# Patient Record
Sex: MALE | Race: OTHER | Employment: FULL TIME | URBAN - METROPOLITAN AREA
[De-identification: names, ages, dates, MRNs, and addresses within clinical notes are randomized per-mention and may not be internally consistent; named-entity substitution may affect disease eponyms.]

---

## 2017-10-06 ENCOUNTER — HOSPITAL ENCOUNTER (EMERGENCY)
Facility: HOSPITAL | Age: 32
Discharge: HOME | End: 2017-10-06
Attending: EMERGENCY MEDICINE
Payer: COMMERCIAL

## 2017-10-06 VITALS
SYSTOLIC BLOOD PRESSURE: 132 MMHG | DIASTOLIC BLOOD PRESSURE: 86 MMHG | HEART RATE: 84 BPM | WEIGHT: 130.06 LBS | OXYGEN SATURATION: 100 % | RESPIRATION RATE: 20 BRPM | BODY MASS INDEX: 21.67 KG/M2 | HEIGHT: 65 IN | TEMPERATURE: 98 F

## 2017-10-06 DIAGNOSIS — E10.9 TYPE 1 DIABETES MELLITUS WITHOUT COMPLICATION (HCC): Primary | ICD-10-CM

## 2017-10-06 DIAGNOSIS — Z76.0 MEDICATION REFILL: ICD-10-CM

## 2017-10-06 PROCEDURE — 99283 EMERGENCY DEPT VISIT LOW MDM: CPT

## 2017-10-06 PROCEDURE — 82962 GLUCOSE BLOOD TEST: CPT

## 2017-10-06 NOTE — ED INITIAL ASSESSMENT (HPI)
Pt states his insulin ran out this morning and tried to reach MD all day unable to, now here for medication refill,

## 2017-10-07 NOTE — ED PROVIDER NOTES
Patient Seen in: [facility] Emergency Department    History   Patient presents with:  Medication Administration      HPI    Patient presents to the ED requesting a refill of his Humalog insulin.   States that his bowel broke and he is currently out 132/86  Pulse: 84  Resp: 20  Temp: 98.2 °F (36.8 °C)  Temp src: Temporal  SpO2: 100 %  O2 Device: None (Room air)    Physical Exam   Constitutional: He is oriented to person, place, and time. He appears well-developed and well-nourished. No distress.    HEN discharge.     Condition upon leaving the department: Stable    Disposition and Plan     Clinical Impression:  Type 1 diabetes mellitus without complication ([facility] [address]  (primary encounter diagnosis)  Medication refill    Disposition:  Discharge    Follow-up:  Perfecto Guzman

## 2018-01-06 ENCOUNTER — HOSPITAL ENCOUNTER (OUTPATIENT)
Age: 33
Discharge: HOME | End: 2018-01-06
Payer: COMMERCIAL

## 2018-01-06 VITALS
BODY MASS INDEX: 20.9 KG/M2 | OXYGEN SATURATION: 100 % | HEART RATE: 81 BPM | RESPIRATION RATE: 18 BRPM | TEMPERATURE: 98 F | WEIGHT: 130.06 LBS | HEIGHT: 66 IN | SYSTOLIC BLOOD PRESSURE: 109 MMHG | DIASTOLIC BLOOD PRESSURE: 76 MMHG

## 2018-01-06 DIAGNOSIS — K12.0 CANKER SORES ORAL: Primary | ICD-10-CM

## 2018-01-06 DIAGNOSIS — B08.4 ENTEROVIRAL VESICULAR STOMATITIS WITH EXANTHEM: ICD-10-CM

## 2018-01-06 PROCEDURE — 99213 OFFICE O/P EST LOW 20 MIN: CPT

## 2018-01-06 PROCEDURE — 99214 OFFICE O/P EST MOD 30 MIN: CPT

## 2018-01-06 RX ORDER — PENICILLIN V POTASSIUM 500 MG/1
500 TABLET ORAL 4 TIMES DAILY
Qty: 40 TABLET | Refills: 0 | Status: SHIPPED | OUTPATIENT
Start: 2018-01-06 | End: 2018-01-16

## 2018-01-06 NOTE — ED PROVIDER NOTES
Patient Seen in: [address]    History   Patient presents with:  Dental Problem (dental)    Stated Complaint: ulcer in mouth    HPI    Patient is a 46-year-old male with history of type 1 diabetes who presents for evaluat Mouth/Throat: Mucous membranes are normal. Oral lesions present. Canker sore to buccal mucosa left cheek, no active bleeding, mild surrounding erythema   Eyes: Conjunctivae are normal. Pupils are equal, round, and reactive to light.    Cardiovascular:

## 2018-01-06 NOTE — ED INITIAL ASSESSMENT (HPI)
PATIENT ARRIVED AMBULATORY TO ROOM C/O A SORE TO BILATERAL CHEEKS. PATIENT STATES \"I BIT MY CHEEK YESTERDAY\" NO DRAINAGE FROM ULCERS. NO FEVERS. EASY NON LABORED RESPIRATIONS.

## 2018-07-31 ENCOUNTER — HOSPITAL ENCOUNTER (OUTPATIENT)
Age: 33
Discharge: HOME | End: 2018-07-31
Payer: COMMERCIAL

## 2018-07-31 VITALS
HEART RATE: 92 BPM | OXYGEN SATURATION: 100 % | BODY MASS INDEX: 21.97 KG/M2 | TEMPERATURE: 98 F | HEIGHT: 66 IN | WEIGHT: 136.69 LBS | DIASTOLIC BLOOD PRESSURE: 70 MMHG | RESPIRATION RATE: 20 BRPM | SYSTOLIC BLOOD PRESSURE: 116 MMHG

## 2018-07-31 DIAGNOSIS — S05.02XA ABRASION OF LEFT CORNEA, INITIAL ENCOUNTER: Primary | ICD-10-CM

## 2018-07-31 DIAGNOSIS — S05.92XA LEFT EYE INJURY, INITIAL ENCOUNTER: ICD-10-CM

## 2018-07-31 PROCEDURE — 99213 OFFICE O/P EST LOW 20 MIN: CPT

## 2018-07-31 PROCEDURE — 99214 OFFICE O/P EST MOD 30 MIN: CPT

## 2018-07-31 RX ORDER — OFLOXACIN 3 MG/ML
2 SOLUTION/ DROPS OPHTHALMIC 4 TIMES DAILY
Qty: 1 BOTTLE | Refills: 0 | Status: SHIPPED | OUTPATIENT
Start: 2018-07-31 | End: 2018-08-07

## 2018-07-31 NOTE — ED PROVIDER NOTES
Patient presents with: Eye Visual Problem (opthalmic)      HPI:     Sudha Easley is a 29year old male who presents today with a chief complaint of a left eye injury that occurred a couple days ago.   The patient states he was playing tennis with his eyegla Exam:    /70   Pulse 92   Temp 97.5 °F (36.4 °C) (Oral)   Resp 20   Ht 167.6 cm (5' 6\")   Wt 62 kg   SpO2 100%   BMI 22.06 kg/m²   GENERAL: well developed, well nourished, well hydrated, no distress  SKIN: good skin turgor, no obvious rashes  HEENT: these tonight. He is also aware he can take Tylenol or ibuprofen for discomfort and if he develops any change in vision, eye pain, or worsening symptoms, to go to the nearest emergency department for further evaluation. Diagnosis:    ICD-10-CM    1.  Ab

## 2018-07-31 NOTE — ED INITIAL ASSESSMENT (HPI)
PATIENT REPORTS LEFT EYE PAIN X 2 DAYS. STATES HE WAS ACCIDENTALLY STRUCK BY A TENNIS RACQUET TO HIS LEFT EYE WHILE PLAYING 2 DAYS AGO. WEARING GLASSES DURING THE INJURY.

## 2021-01-11 PROBLEM — M77.8 RIGHT WRIST TENDONITIS: Status: ACTIVE | Noted: 2021-01-11

## 2022-04-08 PROBLEM — M77.8 RIGHT WRIST TENDONITIS: Status: RESOLVED | Noted: 2021-01-11 | Resolved: 2022-04-08

## 2022-07-22 ENCOUNTER — LAB SERVICES (OUTPATIENT)
Dept: LAB | Age: 37
End: 2022-07-22

## 2022-07-22 DIAGNOSIS — E10.9 TYPE 1 DIABETES MELLITUS WITHOUT COMPLICATION (CMD): Primary | ICD-10-CM

## 2022-07-22 DIAGNOSIS — N46.9 MALE INFERTILITY, UNSPECIFIED: ICD-10-CM

## 2022-07-22 LAB
ALBUMIN SERPL-MCNC: 4.5 G/DL (ref 3.6–5.1)
ALBUMIN/CREAT UR: 62.5 MG/G
ALP SERPL-CCNC: 110 U/L (ref 45–115)
ALT SERPL W/O P-5'-P-CCNC: 39 U/L (ref 5–49)
AST SERPL-CCNC: 32 U/L (ref 14–43)
BILIRUB CONJ SERPL-MCNC: 0 MG/DL (ref 0–0.3)
BILIRUB SERPL-MCNC: 1.1 MG/DL (ref 0–1.3)
CHOLEST SERPL-MCNC: 249 MG/DL (ref 140–200)
CREAT SERPL-MCNC: 0.9 MG/DL (ref 0.6–1.6)
CREAT UR-MCNC: 65 MG/DL (ref 30–125)
EST. AVERAGE GLUCOSE BLD GHB EST-MCNC: 196 MG/DL (ref 0–154)
GFR SERPL CREATININE-BSD FRML MDRD: >60 ML/MIN/{1.73M2}
GFR SERPL CREATININE-BSD FRML MDRD: >60 ML/MIN/{1.73M2}
HBA1C MFR BLD: 8.5 % (ref 4.2–6)
HDLC SERPL-MCNC: 43 MG/DL
LDLC SERPL CALC-MCNC: 159 MG/DL (ref 30–100)
MICROALBUMIN UR-MCNC: 40.6 MG/L
PROT SERPL-MCNC: 7.5 G/DL (ref 6.4–8.5)
T4 FREE SERPL-MCNC: 1.38 NG/DL (ref 0.78–2.19)
TRIGL SERPL-MCNC: 234 MG/DL (ref 0–200)
TSH SERPL DL<=0.05 MIU/L-ACNC: 1.19 M[IU]/L (ref 0.3–4.82)

## 2022-07-22 PROCEDURE — 84443 ASSAY THYROID STIM HORMONE: CPT | Performed by: INTERNAL MEDICINE

## 2022-07-22 PROCEDURE — 36415 COLL VENOUS BLD VENIPUNCTURE: CPT | Performed by: INTERNAL MEDICINE

## 2022-07-22 PROCEDURE — 82043 UR ALBUMIN QUANTITATIVE: CPT | Performed by: INTERNAL MEDICINE

## 2022-07-22 PROCEDURE — 83036 HEMOGLOBIN GLYCOSYLATED A1C: CPT | Performed by: INTERNAL MEDICINE

## 2022-07-22 PROCEDURE — 80061 LIPID PANEL: CPT | Performed by: INTERNAL MEDICINE

## 2022-07-22 PROCEDURE — 82565 ASSAY OF CREATININE: CPT | Performed by: INTERNAL MEDICINE

## 2022-07-22 PROCEDURE — 84439 ASSAY OF FREE THYROXINE: CPT | Performed by: INTERNAL MEDICINE

## 2022-07-22 PROCEDURE — 82570 ASSAY OF URINE CREATININE: CPT | Performed by: INTERNAL MEDICINE

## 2022-07-22 PROCEDURE — 80076 HEPATIC FUNCTION PANEL: CPT | Performed by: INTERNAL MEDICINE

## 2022-07-25 LAB — FAX RESULTS: NORMAL

## 2023-02-01 ENCOUNTER — APPOINTMENT (OUTPATIENT)
Dept: INTERNAL MEDICINE | Age: 38
End: 2023-02-01

## 2023-10-26 ENCOUNTER — LAB SERVICES (OUTPATIENT)
Dept: LAB | Age: 38
End: 2023-10-26

## 2023-10-26 DIAGNOSIS — E10.9 TYPE 1 DIABETES MELLITUS WITHOUT COMPLICATIONS (CMD): Primary | ICD-10-CM

## 2023-10-26 LAB
ALBUMIN SERPL-MCNC: 3.9 G/DL (ref 3.6–5.1)
ALP SERPL-CCNC: 128 UNITS/L (ref 45–117)
ALT SERPL-CCNC: 50 UNITS/L
AST SERPL-CCNC: 31 UNITS/L
BILIRUB CONJ SERPL-MCNC: 0.2 MG/DL (ref 0–0.2)
BILIRUB SERPL-MCNC: 1.2 MG/DL (ref 0.2–1)
CREAT SERPL-MCNC: 0.86 MG/DL (ref 0.67–1.17)
EGFRCR SERPLBLD CKD-EPI 2021: >90 ML/MIN/{1.73_M2}
HBA1C MFR BLD: 6.6 % (ref 4.5–5.6)
PROT SERPL-MCNC: 7 G/DL (ref 6.4–8.2)

## 2023-10-26 PROCEDURE — 36415 COLL VENOUS BLD VENIPUNCTURE: CPT | Performed by: INTERNAL MEDICINE

## 2023-10-26 PROCEDURE — 82565 ASSAY OF CREATININE: CPT | Performed by: INTERNAL MEDICINE

## 2023-10-26 PROCEDURE — 83036 HEMOGLOBIN GLYCOSYLATED A1C: CPT | Performed by: INTERNAL MEDICINE

## 2023-10-26 PROCEDURE — 80076 HEPATIC FUNCTION PANEL: CPT | Performed by: INTERNAL MEDICINE

## 2024-03-08 ENCOUNTER — APPOINTMENT (OUTPATIENT)
Dept: GENERAL RADIOLOGY | Age: 39
End: 2024-03-08
Attending: NURSE PRACTITIONER
Payer: COMMERCIAL

## 2024-03-08 ENCOUNTER — HOSPITAL ENCOUNTER (OUTPATIENT)
Age: 39
Discharge: HOME | End: 2024-03-08
Payer: COMMERCIAL

## 2024-03-08 VITALS
RESPIRATION RATE: 20 BRPM | SYSTOLIC BLOOD PRESSURE: 137 MMHG | BODY MASS INDEX: 21.34 KG/M2 | OXYGEN SATURATION: 99 % | TEMPERATURE: 98 F | WEIGHT: 125 LBS | HEIGHT: 64 IN | DIASTOLIC BLOOD PRESSURE: 94 MMHG | HEART RATE: 104 BPM

## 2024-03-08 DIAGNOSIS — H66.90 ACUTE OTITIS MEDIA, UNSPECIFIED OTITIS MEDIA TYPE: Primary | ICD-10-CM

## 2024-03-08 DIAGNOSIS — R00.0 SINUS TACHYCARDIA: ICD-10-CM

## 2024-03-08 LAB
#MXD IC: 0.4 X10ˆ3/UL (ref 0.1–1)
ATRIAL RATE: 94 BPM
BUN BLD-MCNC: 10 MG/DL (ref 7–18)
CHLORIDE BLD-SCNC: 99 MMOL/L (ref 98–112)
CO2 BLD-SCNC: 29 MMOL/L (ref 21–32)
CREAT BLD-MCNC: 0.9 MG/DL
DDIMER WHOLE BLOOD: <200 NG/ML DDU (ref ?–400)
EGFRCR SERPLBLD CKD-EPI 2021: 112 ML/MIN/1.73M2 (ref 60–?)
GLUCOSE BLD-MCNC: 180 MG/DL (ref 70–99)
HCT VFR BLD AUTO: 46.2 %
HCT VFR BLD CALC: 46 %
HGB BLD-MCNC: 15.8 G/DL
ISTAT IONIZED CALCIUM FOR CHEM 8: 1.2 MMOL/L (ref 1.12–1.32)
LYMPHOCYTES # BLD AUTO: 2.2 X10ˆ3/UL (ref 1–4)
LYMPHOCYTES NFR BLD AUTO: 23.2 %
MCH RBC QN AUTO: 28.1 PG (ref 26–34)
MCHC RBC AUTO-ENTMCNC: 34.2 G/DL (ref 31–37)
MCV RBC AUTO: 82.2 FL (ref 80–100)
MIXED CELL %: 4.4 %
NEUTROPHILS # BLD AUTO: 7 X10ˆ3/UL (ref 1.5–7.7)
NEUTROPHILS NFR BLD AUTO: 72.4 %
P AXIS: 56 DEGREES
P-R INTERVAL: 136 MS
PLATELET # BLD AUTO: 296 X10ˆ3/UL (ref 150–450)
POTASSIUM BLD-SCNC: 4.3 MMOL/L (ref 3.6–5.1)
Q-T INTERVAL: 332 MS
QRS DURATION: 76 MS
QTC CALCULATION (BEZET): 415 MS
R AXIS: 66 DEGREES
RBC # BLD AUTO: 5.62 X10ˆ6/UL
SODIUM BLD-SCNC: 139 MMOL/L (ref 136–145)
T AXIS: 63 DEGREES
TROPONIN I BLD-MCNC: <0.02 NG/ML
VENTRICULAR RATE: 94 BPM
WBC # BLD AUTO: 9.6 X10ˆ3/UL (ref 4–11)

## 2024-03-08 PROCEDURE — 93000 ELECTROCARDIOGRAM COMPLETE: CPT | Performed by: NURSE PRACTITIONER

## 2024-03-08 PROCEDURE — 99204 OFFICE O/P NEW MOD 45 MIN: CPT | Performed by: NURSE PRACTITIONER

## 2024-03-08 PROCEDURE — 85025 COMPLETE CBC W/AUTO DIFF WBC: CPT | Performed by: NURSE PRACTITIONER

## 2024-03-08 PROCEDURE — 84484 ASSAY OF TROPONIN QUANT: CPT | Performed by: NURSE PRACTITIONER

## 2024-03-08 PROCEDURE — 85378 FIBRIN DEGRADE SEMIQUANT: CPT | Performed by: NURSE PRACTITIONER

## 2024-03-08 PROCEDURE — 80047 BASIC METABLC PNL IONIZED CA: CPT | Performed by: NURSE PRACTITIONER

## 2024-03-08 PROCEDURE — 71046 X-RAY EXAM CHEST 2 VIEWS: CPT | Performed by: NURSE PRACTITIONER

## 2024-03-08 RX ORDER — SODIUM CHLORIDE 9 MG/ML
1000 INJECTION, SOLUTION INTRAVENOUS ONCE
Status: COMPLETED | OUTPATIENT
Start: 2024-03-08 | End: 2024-03-08

## 2024-03-08 RX ORDER — AMOXICILLIN AND CLAVULANATE POTASSIUM 875; 125 MG/1; MG/1
1 TABLET, FILM COATED ORAL 2 TIMES DAILY
Qty: 20 TABLET | Refills: 0 | Status: SHIPPED | OUTPATIENT
Start: 2024-03-08 | End: 2024-03-18

## 2024-03-08 RX ORDER — AMOXICILLIN AND CLAVULANATE POTASSIUM 875; 125 MG/1; MG/1
1 TABLET, FILM COATED ORAL 2 TIMES DAILY
Qty: 20 TABLET | Refills: 0 | Status: SHIPPED | OUTPATIENT
Start: 2024-03-08 | End: 2024-03-08

## 2024-03-08 RX ORDER — BENZONATATE 100 MG/1
100 CAPSULE ORAL 3 TIMES DAILY PRN
Qty: 30 CAPSULE | Refills: 0 | Status: SHIPPED | OUTPATIENT
Start: 2024-03-08 | End: 2024-04-07

## 2024-03-08 NOTE — ED PROVIDER NOTES
Patient Seen in: [facility]      History     Chief Complaint   Patient presents with    Cough    Ear Pain     Stated Complaint: heart elevated x 3 days , right ear infection x 1 week , cough    Subjective:   39-year-old male presents today with complaints of tachycardia.  States did not feel palpitations but his Apple Watch alerted him to high heart rate in 110s to 120 bpm.  Patient denies having any chest pain shortness of breath.  Recently has been sick with URI symptoms ear pain and lingering cough.  Was treated with antibiotics while in [place] however states did not complete the prescription.  Patient recently did fly from [place] and has been back for about 1 week.  No recent nausea vomiting diarrhea.  No recent issues with bleeding.  No changes in medications.  Denies any headaches dizziness no changes of vision.  No swelling to extremities.  Denies any fever or chills, is afebrile on arrival.  Patient is alert oriented x 3.  No other symptoms or concerns.  The patient's medication list, past medical history and social history elements as listed in today's nurse's notes were reviewed and agreed (except as otherwise stated in the HPI).  The patient's family history reviewed and determined to be noncontributory to the presenting problem            Objective:   No pertinent past medical history.            No pertinent past surgical history.              No pertinent social history.            Review of Systems    Positive for stated complaint: heart elevated x 3 days , right ear infection x 1 week , cough  Other systems are as noted in HPI.  Constitutional and vital signs reviewed.      All other systems reviewed and negative except as noted above.    Physical Exam     ED Triage Vitals [03/08/24 1224]   BP (!) 137/94   Pulse 104   Resp 20   Temp 97.8 °F (36.6 °C)   Temp src Temporal   SpO2 99 %   O2 Device None (Room air)       Current:BP (!) 137/94   Pulse 104   Temp 97.8 °F (36.6 °C) (Temporal)    Resp 20   Ht 162.6 cm (5' 4\")   Wt 56.7 kg   SpO2 99%   BMI 21.46 kg/m²         Physical Exam  Vitals and nursing note reviewed.   Constitutional:       General: He is not in acute distress.     Appearance: Normal appearance. He is not ill-appearing.   HENT:      Head: Normocephalic.      Right Ear: Tympanic membrane is erythematous.      Left Ear: Tympanic membrane is erythematous.      Nose: Nose normal.      Mouth/Throat:      Lips: Pink.      Mouth: Mucous membranes are moist.   Eyes:      Pupils: Pupils are equal, round, and reactive to light.   Cardiovascular:      Rate and Rhythm: Regular rhythm. Tachycardia present.   Pulmonary:      Effort: Pulmonary effort is normal.      Breath sounds: Normal breath sounds.   Musculoskeletal:      Cervical back: Normal range of motion and neck supple.   Skin:     General: Skin is warm and dry.   Neurological:      Mental Status: He is alert and oriented to person, place, and time.               ED Course     Labs Reviewed   POCT ISTAT CHEM8 CARTRIDGE - Abnormal; Notable for the following components:       Result Value    ISTAT Glucose 180 (*)     All other components within normal limits   D-DIMER (POC) - Normal   ISTAT TROPONIN - Normal   POCT CBC     EKG    Rate, intervals and axes as noted on EKG Report.  Rate: 94  Rhythm: Sinus Rhythm  Reading: Normal sinus rhythm.  No ST elevation ischemic changes.  .  QRS 76.  QT\QTc [id number]                          MDM     Please note that this report has been produced using speech recognition software and may contain errors related to that system including, but not limited to, errors in grammar, punctuation, and spelling, as well as words and phrases that possibly may have been recognized inappropriately.  If there are any questions or concerns, contact the dictating provider for clarification.        Note to patient: The 21st Century Cures Act makes medical notes like these available to patients in the interest of  transparency. However, this is a medical document intended as peer to peer communication. It is written in medical language and may contain abbreviations or verbiage that are unfamiliar. It may appear blunt or direct. Medical documents are intended to carry relevant information, facts as evident, and the clinical opinion of the practitioner.                                   Medical Decision Making  Differential diagnosis includes but is not limited to: Arrhythmia, anemia, dehydration, MI, PE      Presents today with complaints of tachycardia.  States has been getting alerts on his Apple Watch telling him that his heart rate is elevated.  Denies any palpitations, chest pain shortness of breath.  No dizziness or nausea vomiting.  Recent travel from [place] presented sitting swelling to extremities.  No history of DVT PE.  Lungs were clear on exam.  EKG showed normal sinus rhythm.  Chest x-ray no consolidation or acute findings.  D-dimer troponin were both done and negative.  CBC and i-STAT both showed no acute findings.  On exam patient did have some redness continuing in the right ear.  Will treat for possible ongoing otitis media.  Patient give prescription for Augmentin.  Encourage patient to push fluids at home, was given a liter of fluid bolus of 0.9 normal saline.  To avoid caffeine or other medications that may increase heart rate.  Instructed to follow-up with his primary care physician in 1 week for reevaluation.  To go directly to the ER with any chest pain, palpitations, shortness of breath, dizziness, or any worsening of symptoms.  Patient verbalized understanding and agreed to plan of care.  Discussed patient with Dr. Pham, attending of the IC today, who agrees with plan of care.    Amount and/or Complexity of Data Reviewed  Labs: ordered. Decision-making details documented in ED Course.     Details: CBC  I-STAT  Troponin  D-dimer  Radiology: ordered and independent interpretation performed.  Decision-making details documented in ED Course.     Details: Chest x-ray  ECG/medicine tests: ordered and independent interpretation performed.     Details: Normal sinus rhythm.  No ST elevation ischemic changes    Risk  OTC drugs.  Prescription drug management.        Disposition and Plan     Clinical Impression:  1. Acute otitis media, unspecified otitis media type    2. Sinus tachycardia         Disposition:  Discharge  3/8/2024  2:01 pm    Follow-up:  Ruthy Zhu MD  [address] [place] [address]  [phone]    In 1 week  for recheck          Medications Prescribed:  Current Discharge Medication List        START taking these medications    Details   amoxicillin clavulanate 875-125 MG Oral Tab Take 1 tablet by mouth 2 (two) times daily for 10 days.  Qty: 20 tablet, Refills: 0      benzonatate 100 MG Oral Cap Take 1 capsule (100 mg total) by mouth 3 (three) times daily as needed for cough.  Qty: 30 capsule, Refills: 0

## (undated) NOTE — ED AVS SNAPSHOT
[place] Number   MRN: [id number]    Department:  [facility] Emergency Department   Date of Visit:  10/6/2017           Disclosure     Insurance plans vary and the physician(s) referred by the ER may not be covered by your plan.  Please contact your CARE PHYSICIAN AT ONCE OR RETURN IMMEDIATELY TO THE EMERGENCY DEPARTMENT. If you have been prescribed any medication(s), please fill your prescription right away and begin taking the medication(s) as directed.   If you believe that any of the medications